# Patient Record
Sex: FEMALE | Race: ASIAN
[De-identification: names, ages, dates, MRNs, and addresses within clinical notes are randomized per-mention and may not be internally consistent; named-entity substitution may affect disease eponyms.]

---

## 2020-03-04 ENCOUNTER — HOSPITAL ENCOUNTER (INPATIENT)
Dept: HOSPITAL 72 - EMR | Age: 85
LOS: 6 days | Discharge: NURSING, REHAB OR LONG-TERM CARE | DRG: 871 | End: 2020-03-10
Payer: MEDICARE

## 2020-03-04 VITALS — SYSTOLIC BLOOD PRESSURE: 112 MMHG | DIASTOLIC BLOOD PRESSURE: 45 MMHG

## 2020-03-04 VITALS — SYSTOLIC BLOOD PRESSURE: 105 MMHG | DIASTOLIC BLOOD PRESSURE: 51 MMHG

## 2020-03-04 VITALS — SYSTOLIC BLOOD PRESSURE: 104 MMHG | DIASTOLIC BLOOD PRESSURE: 58 MMHG

## 2020-03-04 VITALS — SYSTOLIC BLOOD PRESSURE: 103 MMHG | DIASTOLIC BLOOD PRESSURE: 62 MMHG

## 2020-03-04 VITALS — HEIGHT: 60 IN | BODY MASS INDEX: 26.31 KG/M2 | WEIGHT: 134 LBS

## 2020-03-04 DIAGNOSIS — F03.91: ICD-10-CM

## 2020-03-04 DIAGNOSIS — E11.65: ICD-10-CM

## 2020-03-04 DIAGNOSIS — B96.20: ICD-10-CM

## 2020-03-04 DIAGNOSIS — Z86.73: ICD-10-CM

## 2020-03-04 DIAGNOSIS — G93.40: ICD-10-CM

## 2020-03-04 DIAGNOSIS — I13.0: ICD-10-CM

## 2020-03-04 DIAGNOSIS — J10.00: ICD-10-CM

## 2020-03-04 DIAGNOSIS — N39.0: ICD-10-CM

## 2020-03-04 DIAGNOSIS — A41.9: Primary | ICD-10-CM

## 2020-03-04 DIAGNOSIS — E11.22: ICD-10-CM

## 2020-03-04 DIAGNOSIS — E87.0: ICD-10-CM

## 2020-03-04 DIAGNOSIS — N18.9: ICD-10-CM

## 2020-03-04 DIAGNOSIS — I25.10: ICD-10-CM

## 2020-03-04 DIAGNOSIS — I50.33: ICD-10-CM

## 2020-03-04 DIAGNOSIS — N17.9: ICD-10-CM

## 2020-03-04 DIAGNOSIS — I35.1: ICD-10-CM

## 2020-03-04 DIAGNOSIS — E44.0: ICD-10-CM

## 2020-03-04 DIAGNOSIS — Z66: ICD-10-CM

## 2020-03-04 DIAGNOSIS — Z16.24: ICD-10-CM

## 2020-03-04 DIAGNOSIS — D69.6: ICD-10-CM

## 2020-03-04 DIAGNOSIS — Z16.12: ICD-10-CM

## 2020-03-04 LAB
ADD MANUAL DIFF: NO
ALBUMIN SERPL-MCNC: 2.9 G/DL (ref 3.4–5)
ALBUMIN/GLOB SERPL: 0.8 {RATIO} (ref 1–2.7)
ALP SERPL-CCNC: 63 U/L (ref 46–116)
ALT SERPL-CCNC: 42 U/L (ref 12–78)
ANION GAP SERPL CALC-SCNC: 12 MMOL/L (ref 5–15)
APPEARANCE UR: (no result)
APTT BLD: 26 SEC (ref 23–33)
APTT PPP: (no result) S
AST SERPL-CCNC: 46 U/L (ref 15–37)
BASOPHILS NFR BLD AUTO: 1.1 % (ref 0–2)
BILIRUB SERPL-MCNC: 0.3 MG/DL (ref 0.2–1)
BUN SERPL-MCNC: 35 MG/DL (ref 7–18)
CALCIUM SERPL-MCNC: 8.8 MG/DL (ref 8.5–10.1)
CHLORIDE SERPL-SCNC: 109 MMOL/L (ref 98–107)
CK MB SERPL-MCNC: < 0.5 NG/ML (ref 0–3.6)
CO2 SERPL-SCNC: 26 MMOL/L (ref 21–32)
CREAT SERPL-MCNC: 1.6 MG/DL (ref 0.55–1.3)
EOSINOPHIL NFR BLD AUTO: 0.2 % (ref 0–3)
ERYTHROCYTE [DISTWIDTH] IN BLOOD BY AUTOMATED COUNT: 11.1 % (ref 11.6–14.8)
GLOBULIN SER-MCNC: 3.8 G/DL
GLUCOSE UR STRIP-MCNC: NEGATIVE MG/DL
HCT VFR BLD CALC: 35.8 % (ref 37–47)
HGB BLD-MCNC: 12.6 G/DL (ref 12–16)
INR PPP: 0.9 (ref 0.9–1.1)
KETONES UR QL STRIP: NEGATIVE
LEUKOCYTE ESTERASE UR QL STRIP: (no result)
LYMPHOCYTES NFR BLD AUTO: 27.1 % (ref 20–45)
MCV RBC AUTO: 100 FL (ref 80–99)
MONOCYTES NFR BLD AUTO: 10.7 % (ref 1–10)
NEUTROPHILS NFR BLD AUTO: 61 % (ref 45–75)
NITRITE UR QL STRIP: POSITIVE
PH UR STRIP: 5 [PH] (ref 4.5–8)
PHOSPHATE SERPL-MCNC: 4.1 MG/DL (ref 2.5–4.9)
PLATELET # BLD: 118 K/UL (ref 150–450)
POTASSIUM SERPL-SCNC: 4.1 MMOL/L (ref 3.5–5.1)
PROT UR QL STRIP: (no result)
RBC # BLD AUTO: 3.58 M/UL (ref 4.2–5.4)
SODIUM SERPL-SCNC: 147 MMOL/L (ref 136–145)
SP GR UR STRIP: 1.01 (ref 1–1.03)
UROBILINOGEN UR-MCNC: NORMAL MG/DL (ref 0–1)
WBC # BLD AUTO: 4.8 K/UL (ref 4.8–10.8)

## 2020-03-04 PROCEDURE — 81003 URINALYSIS AUTO W/O SCOPE: CPT

## 2020-03-04 PROCEDURE — 71045 X-RAY EXAM CHEST 1 VIEW: CPT

## 2020-03-04 PROCEDURE — 83036 HEMOGLOBIN GLYCOSYLATED A1C: CPT

## 2020-03-04 PROCEDURE — 87086 URINE CULTURE/COLONY COUNT: CPT

## 2020-03-04 PROCEDURE — 93005 ELECTROCARDIOGRAM TRACING: CPT

## 2020-03-04 PROCEDURE — 96375 TX/PRO/DX INJ NEW DRUG ADDON: CPT

## 2020-03-04 PROCEDURE — 85025 COMPLETE CBC W/AUTO DIFF WBC: CPT

## 2020-03-04 PROCEDURE — 36415 COLL VENOUS BLD VENIPUNCTURE: CPT

## 2020-03-04 PROCEDURE — 87040 BLOOD CULTURE FOR BACTERIA: CPT

## 2020-03-04 PROCEDURE — 83605 ASSAY OF LACTIC ACID: CPT

## 2020-03-04 PROCEDURE — 36600 WITHDRAWAL OF ARTERIAL BLOOD: CPT

## 2020-03-04 PROCEDURE — 82746 ASSAY OF FOLIC ACID SERUM: CPT

## 2020-03-04 PROCEDURE — 82553 CREATINE MB FRACTION: CPT

## 2020-03-04 PROCEDURE — 93306 TTE W/DOPPLER COMPLETE: CPT

## 2020-03-04 PROCEDURE — 87181 SC STD AGAR DILUTION PER AGT: CPT

## 2020-03-04 PROCEDURE — 96365 THER/PROPH/DIAG IV INF INIT: CPT

## 2020-03-04 PROCEDURE — 85610 PROTHROMBIN TIME: CPT

## 2020-03-04 PROCEDURE — 99285 EMERGENCY DEPT VISIT HI MDM: CPT

## 2020-03-04 PROCEDURE — 82803 BLOOD GASES ANY COMBINATION: CPT

## 2020-03-04 PROCEDURE — 80053 COMPREHEN METABOLIC PANEL: CPT

## 2020-03-04 PROCEDURE — 96368 THER/DIAG CONCURRENT INF: CPT

## 2020-03-04 PROCEDURE — 82962 GLUCOSE BLOOD TEST: CPT

## 2020-03-04 PROCEDURE — 85730 THROMBOPLASTIN TIME PARTIAL: CPT

## 2020-03-04 PROCEDURE — 83735 ASSAY OF MAGNESIUM: CPT

## 2020-03-04 PROCEDURE — 83880 ASSAY OF NATRIURETIC PEPTIDE: CPT

## 2020-03-04 PROCEDURE — 86710 INFLUENZA VIRUS ANTIBODY: CPT

## 2020-03-04 PROCEDURE — 84443 ASSAY THYROID STIM HORMONE: CPT

## 2020-03-04 PROCEDURE — 82607 VITAMIN B-12: CPT

## 2020-03-04 PROCEDURE — 87081 CULTURE SCREEN ONLY: CPT

## 2020-03-04 PROCEDURE — 84100 ASSAY OF PHOSPHORUS: CPT

## 2020-03-04 PROCEDURE — 94640 AIRWAY INHALATION TREATMENT: CPT

## 2020-03-04 RX ADMIN — IPRATROPIUM BROMIDE AND ALBUTEROL SULFATE SCH ML: .5; 3 SOLUTION RESPIRATORY (INHALATION) at 15:02

## 2020-03-04 RX ADMIN — IPRATROPIUM BROMIDE AND ALBUTEROL SULFATE SCH ML: .5; 3 SOLUTION RESPIRATORY (INHALATION) at 15:03

## 2020-03-04 RX ADMIN — IPRATROPIUM BROMIDE AND ALBUTEROL SULFATE SCH ML: .5; 3 SOLUTION RESPIRATORY (INHALATION) at 15:04

## 2020-03-04 NOTE — EMERGENCY ROOM REPORT
History of Present Illness


General


Chief Complaint:  Fever


Source:  Medical Record, EMS





Present Illness


HPI


Patient is a 88-year-old female brought in by EMS for fever and positive 

influenza.  Patient has medical history of chronic kidney disease, hypertension

, diabetes, peripheral vascular disease, anemia, hyperlipidemia, dementia, 

cardiomegaly, CVA, atherosclerosis, and has DNR in chart.  Unable to obtain 

history from patient because she is refusing to respond to my questioning.  Per 

EMS patient had positive influenza in the nursing home.


Allergies:  


Coded Allergies:  


     No Known Allergies (Unverified , 11/21/16)





Patient History


Limited by:  other - Dementia


Reviewed Nursing Documentation:  PMH: Agreed; PSxH: Agreed





Nursing Documentation-PMH


Hx Hypertension:  Yes


Hx Diabetes:  Yes


Hx Cerebrovascular Accident:  Yes





Review of Systems


All Other Systems:  negative except mentioned in HPI





Physical Exam





Vital Signs








  Date Time  Temp Pulse Resp B/P (MAP) Pulse Ox O2 Delivery O2 Flow Rate FiO2


 


3/4/20 12:58 99.9 98 16 104/58 (73) 98 Room Air  








Sp02 EP Interpretation:  reviewed, normal


General Appearance:  no apparent distress, alert, non-toxic


Head:  normocephalic, atraumatic


Eyes:  bilateral eye normal inspection, bilateral eye PERRL


ENT:  normal pharynx, no angioedema


Neck:  full range of motion, supple/symm/no masses


Respiratory:  chest non-tender, rhonchi, wheezing


Cardiovascular #1:  regular rate, rhythm, no edema


Cardiovascular #2:  2+ carotid (R), 2+ carotid (L), 2+ radial (R), 2+ radial (L)

, 2+ dorsalis pedis (R), 2+ dorsalis pedis (L)


Gastrointestinal:  normal bowel sounds, non tender, soft, non-distended, no 

guarding, no rebound


Rectal:  deferred


Genitourinary:  normal inspection, no CVA tenderness


Musculoskeletal:  back normal, normal range of motion, calf tenderness, gait/

station normal, non-tender


Neurologic:  alert


Lymphatic:  no adenopathy





Medical Decision Making


Diagnostic Impression:  


 Primary Impression:  


 Influenza


 Additional Impression:  


 Pneumonia


ER Course


Patient given IV fluids.  Patient started on broad-spectrum antibiotics as well 

as Tamiflu.  Patient has been pancultured.





Last Vital Signs








  Date Time  Temp Pulse Resp B/P (MAP) Pulse Ox O2 Delivery O2 Flow Rate FiO2


 


3/4/20 12:58 99.9 98 16 104/58 (92) 98 Room Air  








Disposition:  ADMITTED AS INPATIENT


Condition:  Critical


Physician Consult:  Dr Myles called in the patient and will be admitting her.











Vivi Vasques M.D. Mar 4, 2020 13:09

## 2020-03-04 NOTE — DIAGNOSTIC IMAGING REPORT
Indication: Dyspnea

 

Comparison:  11/21/2016

 

A single view chest radiograph was obtained.

 

Findings:

 

Study appears somewhat underexposed but that there is evidence of mild pulmonary

vascular congestion centrally. The heart is enlarged. Lung volumes are low. Aorta is

calcified. Bones are osteopenic.

 

IMPRESSION:

 

Suspected mild CHF. Correlate clinically

## 2020-03-05 VITALS — SYSTOLIC BLOOD PRESSURE: 127 MMHG | DIASTOLIC BLOOD PRESSURE: 68 MMHG

## 2020-03-05 VITALS — DIASTOLIC BLOOD PRESSURE: 66 MMHG | SYSTOLIC BLOOD PRESSURE: 125 MMHG

## 2020-03-05 VITALS — DIASTOLIC BLOOD PRESSURE: 67 MMHG | SYSTOLIC BLOOD PRESSURE: 118 MMHG

## 2020-03-05 VITALS — DIASTOLIC BLOOD PRESSURE: 71 MMHG | SYSTOLIC BLOOD PRESSURE: 118 MMHG

## 2020-03-05 VITALS — DIASTOLIC BLOOD PRESSURE: 73 MMHG | SYSTOLIC BLOOD PRESSURE: 150 MMHG

## 2020-03-05 LAB
ADD MANUAL DIFF: NO
ALBUMIN SERPL-MCNC: 2.6 G/DL (ref 3.4–5)
ALBUMIN/GLOB SERPL: 0.7 {RATIO} (ref 1–2.7)
ALP SERPL-CCNC: 59 U/L (ref 46–116)
ALT SERPL-CCNC: 45 U/L (ref 12–78)
ANION GAP SERPL CALC-SCNC: 10 MMOL/L (ref 5–15)
AST SERPL-CCNC: 52 U/L (ref 15–37)
BASOPHILS NFR BLD AUTO: 0.4 % (ref 0–2)
BILIRUB SERPL-MCNC: 0.2 MG/DL (ref 0.2–1)
BUN SERPL-MCNC: 25 MG/DL (ref 7–18)
CALCIUM SERPL-MCNC: 8 MG/DL (ref 8.5–10.1)
CHLORIDE SERPL-SCNC: 109 MMOL/L (ref 98–107)
CO2 SERPL-SCNC: 21 MMOL/L (ref 21–32)
CREAT SERPL-MCNC: 1.2 MG/DL (ref 0.55–1.3)
EOSINOPHIL NFR BLD AUTO: 0 % (ref 0–3)
ERYTHROCYTE [DISTWIDTH] IN BLOOD BY AUTOMATED COUNT: 11.3 % (ref 11.6–14.8)
GLOBULIN SER-MCNC: 3.9 G/DL
HCT VFR BLD CALC: 36.3 % (ref 37–47)
HGB BLD-MCNC: 12.7 G/DL (ref 12–16)
LYMPHOCYTES NFR BLD AUTO: 17.3 % (ref 20–45)
MCV RBC AUTO: 101 FL (ref 80–99)
MONOCYTES NFR BLD AUTO: 2.6 % (ref 1–10)
NEUTROPHILS NFR BLD AUTO: 79.8 % (ref 45–75)
PLATELET # BLD: 113 K/UL (ref 150–450)
POTASSIUM SERPL-SCNC: 4.5 MMOL/L (ref 3.5–5.1)
RBC # BLD AUTO: 3.62 M/UL (ref 4.2–5.4)
SODIUM SERPL-SCNC: 140 MMOL/L (ref 136–145)
WBC # BLD AUTO: 4.2 K/UL (ref 4.8–10.8)

## 2020-03-05 RX ADMIN — HEPARIN SODIUM SCH UNITS: 5000 INJECTION INTRAVENOUS; SUBCUTANEOUS at 21:00

## 2020-03-05 RX ADMIN — INSULIN ASPART SCH UNITS: 100 INJECTION, SOLUTION INTRAVENOUS; SUBCUTANEOUS at 21:00

## 2020-03-05 RX ADMIN — IPRATROPIUM BROMIDE AND ALBUTEROL SULFATE SCH ML: .5; 3 SOLUTION RESPIRATORY (INHALATION) at 07:37

## 2020-03-05 RX ADMIN — IPRATROPIUM BROMIDE AND ALBUTEROL SULFATE SCH ML: .5; 3 SOLUTION RESPIRATORY (INHALATION) at 19:00

## 2020-03-05 RX ADMIN — IPRATROPIUM BROMIDE AND ALBUTEROL SULFATE SCH ML: .5; 3 SOLUTION RESPIRATORY (INHALATION) at 13:57

## 2020-03-05 RX ADMIN — INSULIN ASPART SCH UNITS: 100 INJECTION, SOLUTION INTRAVENOUS; SUBCUTANEOUS at 12:20

## 2020-03-05 RX ADMIN — INSULIN ASPART SCH UNITS: 100 INJECTION, SOLUTION INTRAVENOUS; SUBCUTANEOUS at 16:30

## 2020-03-05 NOTE — HISTORY AND PHYSICAL REPORT
DATE OF ADMISSION:  03/04/2020

CHIEF COMPLAINT:  Influenza, fevers.



HISTORY OF PRESENT ILLNESS:  The patient is an 89-year-old female.  She has

multiple medical problems including history of chronic kidney disease,

hypertension, diabetes, stroke, congestive heart failure.  She presented

from a skilled nursing facility with complaints of fevers, cough,

congestion.  She was influenza A positive.  There has apparently been an

outbreak of influenza at the skilled nursing facility.  On evaluation

here, she was febrile.  She was slightly hypoxic.  She had an elevated

sodium and creatinine.  She also had evidence of urinary tract infection.

She has been started on antibiotic therapy as well as treatment for the

flu and is now admitted for further evaluation and care.



PAST MEDICAL HISTORY:  As above.



PAST SURGICAL HISTORY:  None.



CURRENT MEDICATIONS:  Reconciled and reviewed.



ALLERGIES:  None.



FAMILY HISTORY:  None.



SOCIAL HISTORY:  There is no known history of tobacco, ethanol, or drugs.



REVIEW OF SYSTEMS:  Unobtainable as the patient is confused.



PHYSICAL EXAMINATION:

VITAL SIGNS:  Temperature 98 degrees, pulse 86, respirations 20, blood

pressure 150/73.

GENERAL:  The patient is well developed, no apparent distress.

HEART:  Regular rate and rhythm.

LUNGS:  Clear.

ABDOMEN:  Soft, nontender, nondistended.

EXTREMITIES:  Without clubbing, cyanosis, or edema.



LABORATORY DATA:  UA showed too numerous to count wbc's.  White count 5,

hemoglobin is 12.  Sodium 147, potassium is 4, creatinine is _____.



ASSESSMENT:  This is an elderly female with a history of chronic kidney

disease, hypertension, diabetes, congestive heart failure, admitted with

complaints of influenza and urinary tract infection, possible sepsis.



PLAN:

1. IV antibiotics.

2. Tamiflu for influenza.

3. Gentle hydration.

4. Cardiology consultation to assist with blood pressure management.

5. Follow up pending cultures.

6. Adjust antibiotics as indicated.

7. The patient would be isolated.









  ______________________________________________

  Michael Hernández M.D. DR:  LEN

D:  03/05/2020 06:56

T:  03/05/2020 20:53

JOB#:  [id number]

CC:

## 2020-03-05 NOTE — CONSULTATION
DATE OF CONSULTATION:  03/05/2020

PULMONARY CONSULTATION



CONSULTING PHYSICIAN:  Abrahan Zhao M.D.



HISTORY OF PRESENT ILLNESS:  This is an 89-year-old female who is a nursing

resident.  She was brought to the hospital with cough and congestion.  She

is found to have influenza A positivity.  This was done actually at

outside facility.  A repeat swab at this hospital has been negative.  The

patient at this time appears to be comfortable.



ALLERGIES:  Unreliable.



PAST MEDICAL HISTORY:  Notable for hypertension, diabetes mellitus, CKD,

chronic anemia, hyperlipidemia, previous CVA.



CODE STATUS:  DNR.



MEDICATIONS:  Home medications reviewed and reconciled in the

chart.



SOCIAL HISTORY:  No history of alcohol or tobacco use.  She is a nursing

home resident.



PHYSICAL EXAMINATION:

GENERAL:  Reveals elderly female.  T-max 100.9.

VITAL SIGNS:  Blood pressure 108/60, heart rate 94, respiratory rate 18,

she is afebrile.

HEENT:  Unremarkable.

LUNGS:  Clear breath sounds bilaterally with normal heart sounds.

ABDOMEN:  Soft.  There is no edema.



LABORATORY AND DIAGNOSTIC DATA:  Imaging study obtained overnight shows

evidence for mild pulmonary _____ congestion.



IMPRESSION:

1. Influenza A.

2. Mild pulmonary edema.

3. Hypertension.

4. DNR.



DISCUSSION:  Agree with admission and care.  The patient is started on

Tamiflu.  She appears to be comfortable.  Continue breathing treatments.

We will follow as needed.  Anticipate discharge back to facility in 24

hours.









  ______________________________________________

  Abrahan Zhao M.D.





DR:  MADONNA

D:  03/05/2020 10:58

T:  03/05/2020 15:10

JOB#:  [id number]

CC:

## 2020-03-05 NOTE — CONSULTATION
DATE OF CONSULTATION:  03/04/2020

CARDIOLOGY CONSULTATION



CONSULTING PHYSICIAN:  Conor Patterson M.D.



REQUESTING PHYSICIAN:  Michael Hernández M.D.



REASON FOR CONSULTATION:  Elevated natriuretic peptide essay in the setting

of acute pulmonary infection.



HISTORY OF PRESENT ILLNESS:  This is an 89-year-old female residing at a

skilled nursing facility.  She was seen in the emergency room because of

an abnormal influenza swab as well as upper respiratory symptoms described

by the skilled nursing facility.  The patient's respiratory swab was

positive for influenza A. in the emergency room, however, a repeat swab

was negative.  The patient also had an abnormal chest x-ray and

hospitalization was initiated for further care.  Cardiology evaluation was

requested in view of abnormally elevated natriuretic peptide assay of over

670.



PAST MEDICAL HISTORY:

1. Chronic acute hypertension with hypertensive heart disease.

2. Type 2 diabetes mellitus.

3. Chronic kidney disease.

4. Peripheral artery disease.

5. Anemia of chronic kidney disease.

6. Hyperlipidemia.

7. Cerebrovascular disease with history of cerebrovascular accident and

dementia.

8. Atherosclerosis.

9. Advance directives DNR.



MEDICATIONS:  Prior to admission, reviewed and reconciled.



ALLERGIES:  None known.



SOCIAL HISTORY:  No record of smoking, alcohol, or substance abuse.



REVIEW OF SYSTEMS:  Unobtainable from the patient.  After review of

available records from the skilled nursing facility was completed and

positive findings outlined above.



PHYSICAL EXAMINATION:

VITAL SIGNS:  Temperature 99.9, blood pressure 104/58, heart rate 98,

respiratory rate 16, and oxygen saturation on room air 98%.

HEENT:  Normocephalic and atraumatic.  Conjunctivae pink.  Oropharynx

clear.

NECK:  Supple.  Jugular venous pressure normal.  No accessory muscle use.

LUNGS:  Coarse breath sounds.  Diffuse rhonchi.

CARDIAC:  Regular rhythm and rate.  Normal S1, S2 with a 1/6 systolic

murmur at base.

ABDOMEN:  Soft and nontender.  No guarding or rebound.

EXTREMITIES:  Good pulses.  No edema.

NEUROLOGIC:  Nonfocal.



LABORATORY DATA:  Sodium 147, potassium 4.1, chloride 109, bicarb 26, BUN

35, creatinine 1.6, glucose was 62.  Lactic acid 1.6.  Pro-natriuretic

peptide 670.  Albumin 2.9.  ABG, 7.40, 42, 63.  White count 4.8 and

hemoglobin 12.6.  Urinalysis with too numerous to count white cells.

Chest x-ray with pulmonary venous congestion and possible interstitial

infiltrates.



IMPRESSION:

1. Influenza A.

2. Possible healthcare-associated pneumonia.

3. Dehydration.

4. Hypernatremia.

5. Acute on chronic kidney injury.

6. Hypovolemia.

7. Acute on chronic diastolic congestive heart failure.

8. Cerebrovascular disease with dementia.

9. Hypertensive heart disease.

10. Type 2 diabetes mellitus with complications.

11. Urinary tract infection.

12. Possible sepsis.



PLAN:

1. Respiratory isolation.

2. Empiric antimicrobials.

3. Inhaled bronchodilators as needed.

4. Tamiflu therapy.

5. Hypotonic IV fluids.

6. Hold diuresis for now.

7. DVT prophylaxis.









  ______________________________________________

  Conor Patterson M.D.





DR:  MICHAEL

D:  03/05/2020 02:40

T:  03/05/2020 03:01

JOB#:  [id number]

CC:

## 2020-03-06 VITALS — DIASTOLIC BLOOD PRESSURE: 95 MMHG | SYSTOLIC BLOOD PRESSURE: 116 MMHG

## 2020-03-06 VITALS — DIASTOLIC BLOOD PRESSURE: 85 MMHG | SYSTOLIC BLOOD PRESSURE: 141 MMHG

## 2020-03-06 VITALS — SYSTOLIC BLOOD PRESSURE: 111 MMHG | DIASTOLIC BLOOD PRESSURE: 73 MMHG

## 2020-03-06 VITALS — DIASTOLIC BLOOD PRESSURE: 90 MMHG | SYSTOLIC BLOOD PRESSURE: 120 MMHG

## 2020-03-06 VITALS — DIASTOLIC BLOOD PRESSURE: 82 MMHG | SYSTOLIC BLOOD PRESSURE: 142 MMHG

## 2020-03-06 RX ADMIN — INSULIN ASPART SCH UNITS: 100 INJECTION, SOLUTION INTRAVENOUS; SUBCUTANEOUS at 11:23

## 2020-03-06 RX ADMIN — IPRATROPIUM BROMIDE AND ALBUTEROL SULFATE SCH ML: .5; 3 SOLUTION RESPIRATORY (INHALATION) at 07:51

## 2020-03-06 RX ADMIN — HEPARIN SODIUM SCH UNITS: 5000 INJECTION INTRAVENOUS; SUBCUTANEOUS at 08:17

## 2020-03-06 RX ADMIN — INSULIN ASPART SCH UNITS: 100 INJECTION, SOLUTION INTRAVENOUS; SUBCUTANEOUS at 21:00

## 2020-03-06 RX ADMIN — IPRATROPIUM BROMIDE AND ALBUTEROL SULFATE SCH ML: .5; 3 SOLUTION RESPIRATORY (INHALATION) at 19:16

## 2020-03-06 RX ADMIN — INSULIN ASPART SCH UNITS: 100 INJECTION, SOLUTION INTRAVENOUS; SUBCUTANEOUS at 16:30

## 2020-03-06 RX ADMIN — IPRATROPIUM BROMIDE AND ALBUTEROL SULFATE SCH ML: .5; 3 SOLUTION RESPIRATORY (INHALATION) at 13:00

## 2020-03-06 RX ADMIN — HEPARIN SODIUM SCH UNITS: 5000 INJECTION INTRAVENOUS; SUBCUTANEOUS at 19:44

## 2020-03-06 RX ADMIN — IPRATROPIUM BROMIDE AND ALBUTEROL SULFATE SCH ML: .5; 3 SOLUTION RESPIRATORY (INHALATION) at 00:46

## 2020-03-06 RX ADMIN — INSULIN ASPART SCH UNITS: 100 INJECTION, SOLUTION INTRAVENOUS; SUBCUTANEOUS at 06:30

## 2020-03-06 NOTE — PULMONOLOGY PROGRESS NOTE
Assessment/Plan


Assessment/Plan


IMPRESSION:


1. Influenza A.


2. Mild pulmonary edema.


3. Hypertension.


4. DNR.





DISCUSSION:  Agree with admission and care.  continue


Tamiflu.  She appears to be comfortable.  Continue breathing treatments.


I will follow as needed.  Anticipate discharge back to facility in 24


hours.














  ______________________________________________


  Abrahan Zhao M.D.





Subjective


Interval Events:  None new


Constitutional:  Reports: no symptoms


HEENT:  Repors: no symptoms


Respiratory:  Reports: no symptoms


Cardiovascular:  Reports: no symptoms


Gastrointestinal/Abdominal:  Reports: no symptoms


Genitourinary:  Reports: no symptoms


Allergies:  


Coded Allergies:  


     No Known Allergies (Unverified , 11/21/16)





Objective





Last 24 Hour Vital Signs








  Date Time  Temp Pulse Resp B/P (MAP) Pulse Ox O2 Delivery O2 Flow Rate FiO2


 


3/6/20 12:00 97.3 107 20 142/82 (102) 96   


 


3/6/20 09:00      Room Air  


 


3/6/20 08:00 97.2 94 20 141/85 (103) 94   


 


3/6/20 07:40  83 20  99 Room Air  21





  81 20  93   


 


3/6/20 04:00 97.6 89 17 116/95 (102) 96   


 


3/6/20 00:00 98.5 83 17 120/90 (100) 92   


 


3/5/20 21:00      Room Air  


 


3/5/20 16:00 98.0 110 19 118/71 (87) 93   


 


3/5/20 13:55  92 18  96 Room Air  21





  90 18  94   

















Intake and Output  


 


 3/5/20 3/6/20





 19:00 07:00


 


Intake Total 480 ml 400 ml


 


Output Total  4 ml


 


Balance 480 ml 396 ml


 


  


 


Intake Oral 480 ml 


 


IV Total  400 ml


 


Output Urine Total  4 ml


 


# Voids 4 








General Appearance:  no acute distress


HEENT:  normocephalic


Respiratory/Chest:  chest wall non-tender, lungs clear


Cardiovascular:  normal peripheral pulses


Abdomen:  normal bowel sounds





Microbiology








 Date/Time


Source Procedure


Growth Status


 


 


 3/4/20 14:10


Nasal Nares - Final Complete


 


 3/4/20 14:10


Nasal Nares - Final Complete


 


 3/4/20 19:49


Urine,Clean Catch Urine Culture - Preliminary


Gram Negative Bacillus 1 Resulted











Current Medications








 Medications


  (Trade)  Dose


 Ordered  Sig/Vania


 Route


 PRN Reason  Start Time


 Stop Time Status Last Admin


Dose Admin


 


 Albuterol/


 Ipratropium


  (Albuterol/


 Ipratropium)  3 ml  Q6HRT


 HHN


   3/5/20 07:00


 3/10/20 06:59  3/6/20 07:51


 


 


 Ceftriaxone


 Sodium 1 gm/


 Sodium Chloride  55 ml @ 


 110 mls/hr  Q24H


 IVPB


   3/6/20 15:00


 3/13/20 14:59   


 


 


 Dextrose


  (Dextrose 50%)  25 ml  Q30M  PRN


 IV


 Hypoglycemia  3/5/20 07:00


 4/4/20 06:59   


 


 


 Dextrose


  (Dextrose 50%)  50 ml  Q30M  PRN


 IV


 Hypoglycemia  3/5/20 07:00


 4/4/20 06:59   


 


 


 Heparin Sodium


  (Porcine)


  (Heparin 5000


 units/ml)  5,000 units  EVERY 12  HOURS


 SUBQ


   3/5/20 21:00


 4/4/20 20:59   


 


 


 Insulin Aspart


  (NovoLOG)    BEFORE MEALS AND  HS


 SUBQ


   3/5/20 11:30


 4/4/20 11:29  3/6/20 11:23


 


 


 Oseltamivir


 Phosphate


  (Tamiflu)  30 mg  DAILY


 ORAL


   3/6/20 09:00


 3/10/20 08:59  3/6/20 12:00


 

















Abrahan Zhao MD Mar 6, 2020 13:58

## 2020-03-06 NOTE — GENERAL PROGRESS NOTE
Assessment/Plan


Problem List:  


(1) UTI (urinary tract infection)


ICD Codes:  N39.0 - Urinary tract infection, site not specified


SNOMED:  85388622


(2) Encephalopathy


ICD Codes:  G93.40 - Encephalopathy, unspecified


SNOMED:  58327316


(3) Diabetes mellitus


ICD Codes:  E11.9 - Type 2 diabetes mellitus without complications


SNOMED:  92077442


(4) Sepsis


ICD Codes:  A41.9 - Sepsis, unspecified organism


SNOMED:  80392886


(5) Acute encephalopathy


ICD Codes:  G93.40 - Encephalopathy, unspecified


SNOMED:  7163597


(6) Influenza


ICD Codes:  J11.1 - Influenza due to unidentified influenza virus with other 

respiratory manifestations


SNOMED:  3545206


(7) Pneumonia


ICD Codes:  J18.9 - Pneumonia, unspecified organism


SNOMED:  073699459


Status:  stable, progressing


Assessment/Plan:


tamiflu


resp care


tylenol


ivf 


iv abx. 


follow up cultures


anxiolytics


compliance stressed





Subjective


ROS Limited/Unobtainable:  No


Constitutional:  Reports: malaise, weakness


HEENT:  Reports: no symptoms


Cardiovascular:  Reports: no symptoms


Respiratory:  Reports: cough, shortness of breath


Gastrointestinal/Abdominal:  Reports: no symptoms


Genitourinary:  Reports: no symptoms


Neurologic/Psychiatric:  Reports: anxiety, emotional problems, pre-existing 

deficit


Endocrine:  Reports: no symptoms


Hematologic/Lymphatic:  Reports: anemia


Allergies:  


Coded Allergies:  


     No Known Allergies (Unverified , 11/21/16)


All Systems:  reviewed and negative except above


Subjective


no events. uncooperative with meds and care at times. refused tamiflu this 

morning but agreed to take this afternoon. on iv abx. Ucx with gnr





Objective





Last 24 Hour Vital Signs








  Date Time  Temp Pulse Resp B/P (MAP) Pulse Ox O2 Delivery O2 Flow Rate FiO2


 


3/6/20 12:00 97.3 107 20 142/82 (102) 96   


 


3/6/20 09:00      Room Air  


 


3/6/20 08:00 97.2 94 20 141/85 (103) 94   


 


3/6/20 07:40  83 20  99 Room Air  21





  81 20  93   


 


3/6/20 04:00 97.6 89 17 116/95 (102) 96   


 


3/6/20 00:00 98.5 83 17 120/90 (100) 92   


 


3/5/20 21:00      Room Air  


 


3/5/20 16:00 98.0 110 19 118/71 (87) 93   

















Intake and Output  


 


 3/5/20 3/6/20





 19:00 07:00


 


Intake Total 480 ml 400 ml


 


Output Total  4 ml


 


Balance 480 ml 396 ml


 


  


 


Intake Oral 480 ml 


 


IV Total  400 ml


 


Output Urine Total  4 ml


 


# Voids 4 








Height (Feet):  5


Height (Inches):  0.00


Weight (Pounds):  135


General Appearance:  WD/WN, alert, confused


Neck:  supple


Cardiovascular:  normal rate, regular rhythm


Respiratory/Chest:  chest wall non-tender, lungs clear, normal breath sounds, 

no respiratory distress, no accessory muscle use


Abdomen:  normal bowel sounds, non tender, soft, no organomegaly, no mass


Edema:  no edema noted Arm (L), no edema noted Arm (R), no edema noted Leg (L), 

no edema noted Leg (R), no edema noted Pedal (L), no edema noted Pedal (R), no 

edema noted Generalized


Neurologic:  alert











Michael Hernández MD Mar 6, 2020 15:33

## 2020-03-06 NOTE — PROGRESS NOTE
DATE:  03/05/2020

CARDIOLOGY PROGRESS NOTE



SUBJECTIVE:  The patient has less congestion and shortness of

breath.



OBJECTIVE:

VITAL SIGNS:  Blood pressure 118/71, pulse 110, and respirations 19.

Afebrile.  Oxygen saturation 93% on room air.



LABORATORY DATA:  Urine culture pending.  White count 4.2, hemoglobin 12.7.

Sodium 140, potassium 4.5, BUN 25, and creatinine 1.2.  Glucose 362.  B12

and folate, thyroid function within normal limits.  Albumin 2.6.



IMPRESSION:

1. Type 2 diabetes mellitus with hyperglycemia.

2. Influenza A.

3. Dehydration and hypernatremia, recovered.

4. Acute on chronic renal failure, resolved.

5. Acute on chronic diastolic congestive heart failure, clinically

improved.



PLAN:

1. Antiviral therapy.

2. Respiratory hygiene.

3. Await urine culture.

4. Empiric antimicrobials.

5. Adjust IV fluids.

6. Recheck lab studies.

7. Trend natriuretic peptide assay.

8. Discontinue IV fluids.









  ______________________________________________

  Conor Patterson M.D.





DR:  CAREY

D:  03/05/2020 23:39

T:  03/06/2020 00:01

JOB#:  [id number]

CC:

## 2020-03-07 VITALS — DIASTOLIC BLOOD PRESSURE: 64 MMHG | SYSTOLIC BLOOD PRESSURE: 127 MMHG

## 2020-03-07 VITALS — SYSTOLIC BLOOD PRESSURE: 137 MMHG | DIASTOLIC BLOOD PRESSURE: 68 MMHG

## 2020-03-07 VITALS — SYSTOLIC BLOOD PRESSURE: 110 MMHG | DIASTOLIC BLOOD PRESSURE: 61 MMHG

## 2020-03-07 VITALS — DIASTOLIC BLOOD PRESSURE: 70 MMHG | SYSTOLIC BLOOD PRESSURE: 115 MMHG

## 2020-03-07 VITALS — DIASTOLIC BLOOD PRESSURE: 75 MMHG | SYSTOLIC BLOOD PRESSURE: 139 MMHG

## 2020-03-07 VITALS — DIASTOLIC BLOOD PRESSURE: 69 MMHG | SYSTOLIC BLOOD PRESSURE: 138 MMHG

## 2020-03-07 RX ADMIN — ERTAPENEM SODIUM SCH MLS/HR: 1 INJECTION, POWDER, LYOPHILIZED, FOR SOLUTION INTRAMUSCULAR; INTRAVENOUS at 13:24

## 2020-03-07 RX ADMIN — INSULIN ASPART SCH UNITS: 100 INJECTION, SOLUTION INTRAVENOUS; SUBCUTANEOUS at 06:18

## 2020-03-07 RX ADMIN — INSULIN ASPART SCH UNITS: 100 INJECTION, SOLUTION INTRAVENOUS; SUBCUTANEOUS at 21:00

## 2020-03-07 RX ADMIN — INSULIN ASPART SCH UNITS: 100 INJECTION, SOLUTION INTRAVENOUS; SUBCUTANEOUS at 16:30

## 2020-03-07 RX ADMIN — INSULIN ASPART SCH UNITS: 100 INJECTION, SOLUTION INTRAVENOUS; SUBCUTANEOUS at 11:30

## 2020-03-07 RX ADMIN — HEPARIN SODIUM SCH UNITS: 5000 INJECTION INTRAVENOUS; SUBCUTANEOUS at 09:00

## 2020-03-07 RX ADMIN — HEPARIN SODIUM SCH UNITS: 5000 INJECTION INTRAVENOUS; SUBCUTANEOUS at 21:00

## 2020-03-07 NOTE — GENERAL PROGRESS NOTE
Assessment/Plan


Problem List:  


(1) UTI (urinary tract infection)


ICD Codes:  N39.0 - Urinary tract infection, site not specified


SNOMED:  93473932


(2) Encephalopathy


ICD Codes:  G93.40 - Encephalopathy, unspecified


SNOMED:  01500720


(3) Diabetes mellitus


ICD Codes:  E11.9 - Type 2 diabetes mellitus without complications


SNOMED:  50158319


(4) Sepsis


ICD Codes:  A41.9 - Sepsis, unspecified organism


SNOMED:  65758578


(5) Acute encephalopathy


ICD Codes:  G93.40 - Encephalopathy, unspecified


SNOMED:  4955561


(6) Influenza


ICD Codes:  J11.1 - Influenza due to unidentified influenza virus with other 

respiratory manifestations


SNOMED:  8852152


(7) Pneumonia


ICD Codes:  J18.9 - Pneumonia, unspecified organism


SNOMED:  363915910


Status:  stable, progressing


Assessment/Plan:


tamiflu


resp care


tylenol


ivf 


iv abx. 


id consult


follow up cultures


anxiolytics


compliance stressed





Subjective


ROS Limited/Unobtainable:  No


Constitutional:  Reports: malaise, weakness


HEENT:  Reports: no symptoms


Cardiovascular:  Reports: no symptoms


Respiratory:  Reports: cough


Gastrointestinal/Abdominal:  Reports: no symptoms


Genitourinary:  Reports: no symptoms


Neurologic/Psychiatric:  Reports: anxiety


Endocrine:  Reports: no symptoms


Hematologic/Lymphatic:  Reports: anemia


Allergies:  


Coded Allergies:  


     No Known Allergies (Unverified , 11/21/16)


All Systems:  reviewed and negative except above


Subjective


no events. remains uncooperative with meds and care. no fever or chills. ESBL 

uti noted. on tamiflu





Objective





Last 24 Hour Vital Signs








  Date Time  Temp Pulse Resp B/P (MAP) Pulse Ox O2 Delivery O2 Flow Rate FiO2


 


3/7/20 09:00      Room Air  


 


3/7/20 08:00 98.0 92 20 139/75 (96) 96   


 


3/7/20 04:00 98.0 84 19 115/70 (85) 97   


 


3/7/20 00:00 97.9 96 19 110/61 (77) 98   


 


3/6/20 21:00      Room Air  


 


3/6/20 16:00 98.2 104 20 111/73 (86) 96   


 


3/6/20 12:00 97.3 107 20 142/82 (102) 96   

















Intake and Output  


 


 3/6/20 3/7/20





 19:00 07:00


 


Intake Total 480 ml 


 


Balance 480 ml 


 


  


 


Intake Oral 480 ml 


 


# Voids  3








Height (Feet):  5


Height (Inches):  0.00


Weight (Pounds):  134


Objective


General Appearance:  WD/WN, alert, confused


Neck:  supple


Cardiovascular:  normal rate, regular rhythm


Respiratory/Chest:  chest wall non-tender, lungs clear, normal breath sounds, 

no respiratory distress, no accessory muscle use


Abdomen:  normal bowel sounds, non tender, soft, no organomegaly, no mass


Edema:  no edema noted Arm (L), no edema noted Arm (R), no edema noted Leg (L), 

no edema noted Leg (R), no edema noted Pedal (L), no edema noted Pedal (R), no 

edema noted Generalized


Neurologic:  alert











Michael Hernández MD Mar 7, 2020 11:28

## 2020-03-07 NOTE — PULMONOLOGY PROGRESS NOTE
Assessment/Plan


Assessment/Plan


IMPRESSION:


1. Influenza A.


2. Mild pulmonary edema.


3. Hypertension.


4. DNR.





DISCUSSION: Continue Tamiflu.    Continue breathing treatments.


I will follow as needed.  











  ______________________________________________


  Abrahan Zhao M.D.





Subjective


Interval Events:  None new


Constitutional:  Reports: no symptoms


HEENT:  Repors: no symptoms


Respiratory:  Reports: no symptoms


Cardiovascular:  Reports: no symptoms


Gastrointestinal/Abdominal:  Reports: no symptoms


Genitourinary:  Reports: no symptoms


Allergies:  


Coded Allergies:  


     No Known Allergies (Unverified , 11/21/16)





Objective





Last 24 Hour Vital Signs








  Date Time  Temp Pulse Resp B/P (MAP) Pulse Ox O2 Delivery O2 Flow Rate FiO2


 


3/7/20 16:00 98.1 91 20 137/68 (91) 96   


 


3/7/20 12:00 97.4 86 18 127/64 (85) 97   


 


3/7/20 09:00      Room Air  


 


3/7/20 08:00 98.0 92 20 139/75 (96) 96   


 


3/7/20 04:00 98.0 84 19 115/70 (85) 97   


 


3/7/20 00:00 97.9 96 19 110/61 (77) 98   


 


3/6/20 21:00      Room Air  

















Intake and Output  


 


 3/6/20 3/7/20





 19:00 07:00


 


Intake Total 480 ml 


 


Balance 480 ml 


 


  


 


Intake Oral 480 ml 


 


# Voids  3








General Appearance:  no acute distress


HEENT:  normocephalic


Respiratory/Chest:  chest wall non-tender


Cardiovascular:  normal peripheral pulses


Abdomen:  normal bowel sounds





Microbiology








 Date/Time


Source Procedure


Growth Status


 


 


 3/4/20 20:18


Nasal Nares MRSA Culture - Final


NO METHICILLIN RESISTANT STAPH AUREUS... Complete


 


 3/4/20 19:49


Urine,Clean Catch Urine Culture - Final


Escherichia Coli - Esbl Complete


 


 3/4/20 20:18


Rectum - Final


NO CARBAPENEM-RESISTANT ENTEROBACTERI... Complete


 


 3/4/20 20:18


Rectum VRE Culture - Final


NO VANCOMYCIN RESISTANT ENTEROCOCCUS ... Complete











Current Medications








 Medications


  (Trade)  Dose


 Ordered  Sig/Vania


 Route


 PRN Reason  Start Time


 Stop Time Status Last Admin


Dose Admin


 


 Dextrose


  (Dextrose 50%)  25 ml  Q30M  PRN


 IV


 Hypoglycemia  3/5/20 07:00


 4/4/20 06:59   


 


 


 Dextrose


  (Dextrose 50%)  50 ml  Q30M  PRN


 IV


 Hypoglycemia  3/5/20 07:00


 4/4/20 06:59   


 


 


 Ertapenem 1 gm/


 Sodium Chloride  55 ml @ 


 110 mls/hr  Q24H


 IVPB


   3/7/20 13:00


 3/12/20 12:59  3/7/20 13:24


 


 


 Heparin Sodium


  (Porcine)


  (Heparin 5000


 units/ml)  5,000 units  EVERY 12  HOURS


 SUBQ


   3/5/20 21:00


 4/4/20 20:59   


 


 


 Insulin Aspart


  (NovoLOG)    BEFORE MEALS AND  HS


 SUBQ


   3/5/20 11:30


 4/4/20 11:29  3/6/20 11:23


 


 


 Oseltamivir


 Phosphate


  (Tamiflu)  30 mg  DAILY


 ORAL


   3/6/20 09:00


 3/10/20 08:59  3/7/20 09:32


 

















Abrahan Zhao MD Mar 7, 2020 18:27

## 2020-03-07 NOTE — PROGRESS NOTE
DATE:  03/07/2020

CARDIOLOGY PROGRESS NOTE



SUBJECTIVE:  No chest pain and less congestion noted.



PHYSICAL EXAMINATION:

VITAL SIGNS:  Blood pressure 137/68, pulse 91, respiratory rate 20.

LUNGS:  Few rhonchi.  Nasal discharge.

HEART:  Regular rhythm and rate.  Normal S1, S2.

ABDOMEN:  Soft.  No edema.



Urine culture is positive for ESBL E. coli.



2D echocardiogram reviewed notable for degenerative aortic valve

disease.



IMPRESSION:

1. Influenza A.

2. Upper respiratory infection.

3. Multidrug resistant ESBL E. coli infection of the urine.

4. Sinus tachycardia secondary to acute infection, now resolving

dementia with agitation.

5. Type 2 diabetes mellitus.

6. Atherosclerotic heart disease.

7. 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



PLAN:

1. No need for afterload reduction at this time.  Continue antiviral and

antibiotic therapy per primary care physician.

2. EKG to be reviewed to confirm cardiac rhythm.









  ______________________________________________

  Conor Patterson M.D.





DR:  DANDY

D:  03/07/2020 21:56

T:  03/07/2020 22:56

JOB#:  [id number]

CC:

## 2020-03-07 NOTE — PROGRESS NOTE
DATE:  03/06/2020

CARDIOLOGY PROGRESS NOTE



SUBJECTIVE:  Uncooperative at times.  Refused her Tamiflu earlier today.

Took the afternoon dose.  On IV antimicrobials.  Urine culture positive.



OBJECTIVE:

VITAL SIGNS:  Blood pressure 142/82, heart rate 107, and respiratory rate

20.

LUNGS:  Few rhonchi.

CARDIAC:  Regular rhythm.  Rapid rate.  Normal S1 and S2.

ABDOMEN:  Soft.

EXTREMITIES:  Trace edema.



IMPRESSION:

1. Influenza A.

2. Urinary tract infection.

3. Type 2 diabetes mellitus.

4. Dementia with agitation.

5. Sinus tachycardia.



PLAN:

1. Antiviral and antibiotic therapy.

2. Await final urine culture.

3. Echocardiogram.

4. Consider further cardiac therapy for rapid heart rate.

5. EKG will be reviewed.

6. Discontinue bronchodilators.









  ______________________________________________

  Conor Patterson M.D.





DR:  NIKKY

D:  03/07/2020 01:09

T:  03/07/2020 01:20

JOB#:  [id number]

CC:

## 2020-03-08 VITALS — DIASTOLIC BLOOD PRESSURE: 74 MMHG | SYSTOLIC BLOOD PRESSURE: 131 MMHG

## 2020-03-08 VITALS — DIASTOLIC BLOOD PRESSURE: 69 MMHG | SYSTOLIC BLOOD PRESSURE: 121 MMHG

## 2020-03-08 VITALS — DIASTOLIC BLOOD PRESSURE: 58 MMHG | SYSTOLIC BLOOD PRESSURE: 115 MMHG

## 2020-03-08 VITALS — SYSTOLIC BLOOD PRESSURE: 115 MMHG | DIASTOLIC BLOOD PRESSURE: 57 MMHG

## 2020-03-08 VITALS — SYSTOLIC BLOOD PRESSURE: 122 MMHG | DIASTOLIC BLOOD PRESSURE: 66 MMHG

## 2020-03-08 VITALS — SYSTOLIC BLOOD PRESSURE: 119 MMHG | DIASTOLIC BLOOD PRESSURE: 61 MMHG

## 2020-03-08 RX ADMIN — ERTAPENEM SODIUM SCH MLS/HR: 1 INJECTION, POWDER, LYOPHILIZED, FOR SOLUTION INTRAMUSCULAR; INTRAVENOUS at 14:06

## 2020-03-08 RX ADMIN — INSULIN ASPART SCH UNITS: 100 INJECTION, SOLUTION INTRAVENOUS; SUBCUTANEOUS at 11:30

## 2020-03-08 RX ADMIN — DEXTROSE AND SODIUM CHLORIDE SCH MLS/HR: 5; .45 INJECTION, SOLUTION INTRAVENOUS at 15:48

## 2020-03-08 RX ADMIN — INSULIN ASPART SCH UNITS: 100 INJECTION, SOLUTION INTRAVENOUS; SUBCUTANEOUS at 16:30

## 2020-03-08 RX ADMIN — HEPARIN SODIUM SCH UNITS: 5000 INJECTION INTRAVENOUS; SUBCUTANEOUS at 20:38

## 2020-03-08 RX ADMIN — HEPARIN SODIUM SCH UNITS: 5000 INJECTION INTRAVENOUS; SUBCUTANEOUS at 08:57

## 2020-03-08 RX ADMIN — INSULIN ASPART SCH UNITS: 100 INJECTION, SOLUTION INTRAVENOUS; SUBCUTANEOUS at 06:11

## 2020-03-08 RX ADMIN — INSULIN ASPART SCH UNITS: 100 INJECTION, SOLUTION INTRAVENOUS; SUBCUTANEOUS at 20:40

## 2020-03-08 NOTE — GENERAL PROGRESS NOTE
Assessment/Plan


Problem List:  


(1) UTI (urinary tract infection)


ICD Codes:  N39.0 - Urinary tract infection, site not specified


SNOMED:  59774346


(2) Encephalopathy


ICD Codes:  G93.40 - Encephalopathy, unspecified


SNOMED:  38004827


(3) Diabetes mellitus


ICD Codes:  E11.9 - Type 2 diabetes mellitus without complications


SNOMED:  20304744


(4) Sepsis


ICD Codes:  A41.9 - Sepsis, unspecified organism


SNOMED:  57561837


(5) Acute encephalopathy


ICD Codes:  G93.40 - Encephalopathy, unspecified


SNOMED:  3083462


(6) Influenza


ICD Codes:  J11.1 - Influenza due to unidentified influenza virus with other 

respiratory manifestations


SNOMED:  1820851


(7) Pneumonia


ICD Codes:  J18.9 - Pneumonia, unspecified organism


SNOMED:  527213720


Status:  stable, progressing


Assessment/Plan:


tamiflu


resp care


tylenol


ivf 


iv abx. 


id consult pending. 


follow up cultures


anxiolytics


compliance stressed





Subjective


ROS Limited/Unobtainable:  No


Constitutional:  Reports: malaise, weakness


HEENT:  Reports: no symptoms


Cardiovascular:  Reports: no symptoms


Respiratory:  Reports: cough


Gastrointestinal/Abdominal:  Reports: no symptoms


Genitourinary:  Reports: no symptoms


Neurologic/Psychiatric:  Reports: anxiety


Endocrine:  Reports: no symptoms


Hematologic/Lymphatic:  Reports: anemia


Allergies:  


Coded Allergies:  


     No Known Allergies (Unverified , 11/21/16)


All Systems:  reviewed and negative except above


Subjective


no events. resting. no complaints. no fever or chills. no chest pain


on iv abx. .on ivf. more cooperative with care





Objective





Last 24 Hour Vital Signs








  Date Time  Temp Pulse Resp B/P (MAP) Pulse Ox O2 Delivery O2 Flow Rate FiO2


 


3/8/20 09:00      Room Air  


 


3/8/20 08:00 98.9 80 17 115/57 (76) 98   


 


3/8/20 04:00 98.0 88 20 122/66 (84) 97   


 


3/8/20 00:00 97.5 87 18 115/58 (77) 95   


 


3/7/20 21:00      Room Air  


 


3/7/20 20:00 98.0 92 19 138/69 (92) 96   


 


3/7/20 16:00 98.1 91 20 137/68 (91) 96   


 


3/7/20 12:00 97.4 86 18 127/64 (85) 97   

















Intake and Output  


 


 3/7/20 3/8/20





 19:00 07:00


 


Intake Total 710 ml 260 ml


 


Balance 710 ml 260 ml


 


  


 


Intake Oral 600 ml 260 ml


 


IV Total 110 ml 


 


# Voids 5 2








Height (Feet):  5


Height (Inches):  0.00


Weight (Pounds):  134


Objective


General Appearance:  WD/WN, alert, confused


Neck:  supple


Cardiovascular:  normal rate, regular rhythm


Respiratory/Chest:  chest wall non-tender, lungs clear, normal breath sounds, 

no respiratory distress, no accessory muscle use


Abdomen:  normal bowel sounds, non tender, soft, no organomegaly, no mass


Edema:  no edema noted Arm (L), no edema noted Arm (R), no edema noted Leg (L), 

no edema noted Leg (R), no edema noted Pedal (L), no edema noted Pedal (R), no 

edema noted Generalized


Neurologic:  alert











Michael Hernández MD Mar 8, 2020 10:44

## 2020-03-08 NOTE — CONSULTATION
DATE OF CONSULTATION:  03/08/2020

INFECTIOUS DISEASES CONSULTATION



This consult is for coverage of Dr. Patel



CONSULTING PHYSICIAN:  Ryan Jasso M.D.



PRIMARY ATTENDING PHYSICIAN:  Michael Hernández M.D.



REASON FOR CONSULTATION:  UTI and influenza A.



HISTORY OF PRESENT ILLNESS:  This is an 89-year-old  female, admitted

on March 4, 2020, from a skilled nursing facility because of fever, cough,

congestion, and mild hypoxemia and influenza A test become positive in

nursing facility, also has bacteriuria.  The urine culture become positive

for ESBL E. coli.



PAST MEDICAL HISTORY:  Significant for diabetes mellitus, hypertension,

chronic kidney disease, anemia, dementia, history of CVA.



ALLERGIES:  No known drug allergies.



MEDICATIONS:  On Ertapenem  , Tamiflu, insulin.



SOCIAL HISTORY:  .  Nursing home resident.  No history of alcohol,

drug abuse, and smoking.



REVIEW OF SYSTEMS:  Very limited.



CODE STATUS:  DNR and DNI.



PHYSICAL EXAMINATION:

VITAL SIGNS:  Temperature 98.9, pulse 80, blood pressure 115/57.

GENERAL APPEARANCE:  No acute distress.  Seems to have normal

weight.

HEAD AND NECK:  Pink conjunctiva.

HEART:  Normal rate.

LUNGS:  Clear.

ABDOMEN:  Soft, nontender.

EXTREMITIES:  Has no edema.

NEUROLOGIC:  Open eyes, responsive.



LABORATORY DATA:  WBC 4.2, hemoglobin 12.7, hematocrit 36.3, platelet is

113.  Sodium 140, potassium 4.5, chloride 109, bicarb 21, BUN 25,

creatinine 1.2, glucose is 362.  Albumin is 2.6.  Urine culture grew ESBL

E coli, sensitive to Bactrim, nitrofurantoin, and Zosyn as well as

ertapenem.  Blood culture x2 are negative.  MRSA screen negative.  VRE

screening negative.  Influenza A and B in the hospital are negative.



IMPRESSION:

1. UTI with E. coli ESBL.

2. Influenza A.

3. Diabetes mellitus.

4. Hypertension.

5. Chronic kidney disease.

6. Thrombocytopenia.

7. Dementia.



RECOMMENDATION:  Continue Tamiflu for one more day.  Continue Ertapenem for

more one day.  Check hemoglobin A1c.



At the end of my exam, I thank Dr. Hernández for involving me in the care

of this patient.









  ______________________________________________

  Ryan Jasso M.D.





DR:  SANTOS

D:  03/08/2020 10:48

T:  03/09/2020 00:45

JOB#:  [id number]

CC:



JESSICA

## 2020-03-08 NOTE — PULMONOLOGY PROGRESS NOTE
Assessment/Plan


Assessment/Plan


IMPRESSION:


1. Influenza A.


2. Mild pulmonary edema.


3. Hypertension.


4. DNR.





DISCUSSION: Continue Tamiflu.    Continue breathing treatments.


I will follow as needed.  











  ______________________________________________


  Abrahan Zhao M.D.





Subjective


Interval Events:  None new


Constitutional:  Reports: no symptoms


HEENT:  Repors: no symptoms


Respiratory:  Reports: no symptoms


Cardiovascular:  Reports: no symptoms


Gastrointestinal/Abdominal:  Reports: no symptoms


Allergies:  


Coded Allergies:  


     No Known Allergies (Unverified , 11/21/16)





Objective





Last 24 Hour Vital Signs








  Date Time  Temp Pulse Resp B/P (MAP) Pulse Ox O2 Delivery O2 Flow Rate FiO2


 


3/8/20 04:00 98.0 88 20 122/66 (84) 97   


 


3/8/20 00:00 97.5 87 18 115/58 (77) 95   


 


3/7/20 21:00      Room Air  


 


3/7/20 20:00 98.0 92 19 138/69 (92) 96   


 


3/7/20 16:00 98.1 91 20 137/68 (91) 96   


 


3/7/20 12:00 97.4 86 18 127/64 (85) 97   


 


3/7/20 09:00      Room Air  


 


3/7/20 08:00 98.0 92 20 139/75 (96) 96   

















Intake and Output  


 


 3/7/20 3/8/20





 19:00 07:00


 


Intake Total 710 ml 260 ml


 


Balance 710 ml 260 ml


 


  


 


Intake Oral 600 ml 260 ml


 


IV Total 110 ml 


 


# Voids 5 2








General Appearance:  no acute distress


HEENT:  normocephalic


Respiratory/Chest:  chest wall non-tender


Cardiovascular:  normal peripheral pulses


Abdomen:  normal bowel sounds





Current Medications








 Medications


  (Trade)  Dose


 Ordered  Sig/Vania


 Route


 PRN Reason  Start Time


 Stop Time Status Last Admin


Dose Admin


 


 Dextrose


  (Dextrose 50%)  25 ml  Q30M  PRN


 IV


 Hypoglycemia  3/5/20 07:00


 4/4/20 06:59   


 


 


 Dextrose


  (Dextrose 50%)  50 ml  Q30M  PRN


 IV


 Hypoglycemia  3/5/20 07:00


 4/4/20 06:59   


 


 


 Ertapenem 1 gm/


 Sodium Chloride  55 ml @ 


 110 mls/hr  Q24H


 IVPB


   3/7/20 13:00


 3/12/20 12:59  3/7/20 13:24


 


 


 Heparin Sodium


  (Porcine)


  (Heparin 5000


 units/ml)  5,000 units  EVERY 12  HOURS


 SUBQ


   3/5/20 21:00


 4/4/20 20:59   


 


 


 Insulin Aspart


  (NovoLOG)    BEFORE MEALS AND  HS


 SUBQ


   3/5/20 11:30


 4/4/20 11:29  3/6/20 11:23


 


 


 Oseltamivir


 Phosphate


  (Tamiflu)  30 mg  DAILY


 ORAL


   3/6/20 09:00


 3/10/20 08:59  3/7/20 09:32


 

















Abrahan Zhao MD Mar 8, 2020 07:51

## 2020-03-09 VITALS — SYSTOLIC BLOOD PRESSURE: 131 MMHG | DIASTOLIC BLOOD PRESSURE: 86 MMHG

## 2020-03-09 VITALS — DIASTOLIC BLOOD PRESSURE: 59 MMHG | SYSTOLIC BLOOD PRESSURE: 130 MMHG

## 2020-03-09 VITALS — DIASTOLIC BLOOD PRESSURE: 18 MMHG | SYSTOLIC BLOOD PRESSURE: 125 MMHG

## 2020-03-09 VITALS — SYSTOLIC BLOOD PRESSURE: 131 MMHG | DIASTOLIC BLOOD PRESSURE: 69 MMHG

## 2020-03-09 VITALS — DIASTOLIC BLOOD PRESSURE: 88 MMHG | SYSTOLIC BLOOD PRESSURE: 128 MMHG

## 2020-03-09 RX ADMIN — HEPARIN SODIUM SCH UNITS: 5000 INJECTION INTRAVENOUS; SUBCUTANEOUS at 08:25

## 2020-03-09 RX ADMIN — DEXTROSE AND SODIUM CHLORIDE SCH MLS/HR: 5; .45 INJECTION, SOLUTION INTRAVENOUS at 05:52

## 2020-03-09 RX ADMIN — DEXTROSE AND SODIUM CHLORIDE SCH MLS/HR: 5; .45 INJECTION, SOLUTION INTRAVENOUS at 19:20

## 2020-03-09 RX ADMIN — INSULIN ASPART SCH UNITS: 100 INJECTION, SOLUTION INTRAVENOUS; SUBCUTANEOUS at 20:49

## 2020-03-09 RX ADMIN — INSULIN ASPART SCH UNITS: 100 INJECTION, SOLUTION INTRAVENOUS; SUBCUTANEOUS at 05:53

## 2020-03-09 RX ADMIN — ERTAPENEM SODIUM SCH MLS/HR: 1 INJECTION, POWDER, LYOPHILIZED, FOR SOLUTION INTRAMUSCULAR; INTRAVENOUS at 12:10

## 2020-03-09 RX ADMIN — INSULIN ASPART SCH UNITS: 100 INJECTION, SOLUTION INTRAVENOUS; SUBCUTANEOUS at 12:10

## 2020-03-09 RX ADMIN — INSULIN ASPART SCH UNITS: 100 INJECTION, SOLUTION INTRAVENOUS; SUBCUTANEOUS at 16:30

## 2020-03-09 RX ADMIN — HEPARIN SODIUM SCH UNITS: 5000 INJECTION INTRAVENOUS; SUBCUTANEOUS at 20:48

## 2020-03-09 NOTE — PULMONOLOGY PROGRESS NOTE
Assessment/Plan


Assessment/Plan


IMPRESSION:


1. Influenza A.


2. Mild pulmonary edema.


3. Hypertension.


4. DNR.





DISCUSSION: Continue Tamiflu.    Continue breathing treatments.


I will follow as needed.  











  ______________________________________________


  Abrahan Zhao M.D.





Subjective


Interval Events:  none new


Constitutional:  Reports: no symptoms


HEENT:  Repors: no symptoms


Respiratory:  Reports: no symptoms


Cardiovascular:  Reports: no symptoms


Gastrointestinal/Abdominal:  Reports: no symptoms


Allergies:  


Coded Allergies:  


     No Known Allergies (Unverified , 11/21/16)





Objective





Last 24 Hour Vital Signs








  Date Time  Temp Pulse Resp B/P (MAP) Pulse Ox O2 Delivery O2 Flow Rate FiO2


 


3/9/20 12:00 98.5 81 17 130/59 (82) 96   


 


3/9/20 09:00      Room Air  


 


3/9/20 08:00 98.4 80 18 125/58 (80) 95   


 


3/9/20 04:00 97.4 86 18 128/88 (101) 97   


 


3/9/20 00:00 97.4 93 20 131/86 (101) 96   


 


3/8/20 21:00      Room Air  


 


3/8/20 20:00 97.9 90 19 131/74 (93) 96   


 


3/8/20 16:00 98.0 81 17 121/69 (86) 97   

















Intake and Output  


 


 3/8/20 3/9/20





 19:00 07:00


 


Intake Total 935 ml 900 ml


 


Balance 935 ml 900 ml


 


  


 


IV Total 335 ml 900 ml


 


Other 600 ml 


 


# Voids  2








General Appearance:  no acute distress


HEENT:  normocephalic


Respiratory/Chest:  chest wall non-tender


Cardiovascular:  normal peripheral pulses


Abdomen:  normal bowel sounds


Laboratory Tests


3/9/20 05:50: Hemoglobin A1c 7.5H





Current Medications








 Medications


  (Trade)  Dose


 Ordered  Sig/Vania


 Route


 PRN Reason  Start Time


 Stop Time Status Last Admin


Dose Admin


 


 Dextrose


  (Dextrose 50%)  25 ml  Q30M  PRN


 IV


 Hypoglycemia  3/5/20 07:00


 4/4/20 06:59   


 


 


 Dextrose


  (Dextrose 50%)  50 ml  Q30M  PRN


 IV


 Hypoglycemia  3/5/20 07:00


 4/4/20 06:59   


 


 


 Dextrose/Sodium


 Chloride  1,000 ml @ 


 75 mls/hr  [id number]


 IV


   3/8/20 15:41


 4/7/20 15:40  3/9/20 05:52


 


 


 Ertapenem 1 gm/


 Sodium Chloride  55 ml @ 


 110 mls/hr  Q24H


 IVPB


   3/7/20 13:00


 3/12/20 12:59  3/9/20 12:10


 


 


 Heparin Sodium


  (Porcine)


  (Heparin 5000


 units/ml)  5,000 units  EVERY 12  HOURS


 SUBQ


   3/5/20 21:00


 4/4/20 20:59   


 


 


 Insulin Aspart


  (NovoLOG)    BEFORE MEALS AND  HS


 SUBQ


   3/5/20 11:30


 4/4/20 11:29  3/9/20 12:10


 

















Abrahan Zhao MD Mar 9, 2020 14:25

## 2020-03-09 NOTE — GENERAL PROGRESS NOTE
Assessment/Plan


Problem List:  


(1) UTI (urinary tract infection)


ICD Codes:  N39.0 - Urinary tract infection, site not specified


SNOMED:  15036298


(2) Encephalopathy


ICD Codes:  G93.40 - Encephalopathy, unspecified


SNOMED:  79042999


(3) Diabetes mellitus


ICD Codes:  E11.9 - Type 2 diabetes mellitus without complications


SNOMED:  33223160


(4) Sepsis


ICD Codes:  A41.9 - Sepsis, unspecified organism


SNOMED:  82884943


(5) Acute encephalopathy


ICD Codes:  G93.40 - Encephalopathy, unspecified


SNOMED:  5709178


(6) Influenza


ICD Codes:  J11.1 - Influenza due to unidentified influenza virus with other 

respiratory manifestations


SNOMED:  0675667


(7) Pneumonia


ICD Codes:  J18.9 - Pneumonia, unspecified organism


SNOMED:  031180013


Status:  stable, progressing


Assessment/Plan:


tamiflu dcd


resp care


tylenol


ivf 


iv abx. 


id consult appreciated


follow up cultures


anxiolytics


compliance stressed


snf can accept pt back tomorrow





Subjective


ROS Limited/Unobtainable:  No


Constitutional:  Reports: malaise, weakness


HEENT:  Reports: no symptoms


Cardiovascular:  Reports: no symptoms


Respiratory:  Reports: cough, shortness of breath, sputum


Gastrointestinal/Abdominal:  Reports: no symptoms


Genitourinary:  Reports: no symptoms


Neurologic/Psychiatric:  Reports: no symptoms


Endocrine:  Reports: no symptoms


Hematologic/Lymphatic:  Reports: no symptoms


Allergies:  


Coded Allergies:  


     No Known Allergies (Unverified , 11/21/16)


All Systems:  reviewed and negative except above


Subjective


no events. resting. no complaints. no fever or chills. no chest pain


on iv abx. more cooperative with care. ID and pulm appreciated.





Objective





Last 24 Hour Vital Signs








  Date Time  Temp Pulse Resp B/P (MAP) Pulse Ox O2 Delivery O2 Flow Rate FiO2


 


3/9/20 16:00 97.9 79 17 131/69 (89) 17   


 


3/9/20 12:00 98.5 81 17 130/59 (82) 96   


 


3/9/20 09:00      Room Air  


 


3/9/20 08:00 98.4 80 18 125/58 (80) 95   


 


3/9/20 04:00 97.4 86 18 128/88 (101) 97   


 


3/9/20 00:00 97.4 93 20 131/86 (101) 96   


 


3/8/20 21:00      Room Air  


 


3/8/20 20:00 97.9 90 19 131/74 (93) 96   

















Intake and Output  


 


 3/8/20 3/9/20





 19:00 07:00


 


Intake Total 935 ml 900 ml


 


Balance 935 ml 900 ml


 


  


 


IV Total 335 ml 900 ml


 


Other 600 ml 


 


# Voids  2








Laboratory Tests


3/9/20 05:50: Hemoglobin A1c 7.5H


Height (Feet):  5


Height (Inches):  0.00


Weight (Pounds):  134


Objective


General Appearance:  WD/WN, alert, confused


Neck:  supple


Cardiovascular:  normal rate, regular rhythm


Respiratory/Chest:  chest wall non-tender, lungs clear, normal breath sounds, 

no respiratory distress, no accessory muscle use


Abdomen:  normal bowel sounds, non tender, soft, no organomegaly, no mass


Edema:  no edema noted Arm (L), no edema noted Arm (R), no edema noted Leg (L), 

no edema noted Leg (R), no edema noted Pedal (L), no edema noted Pedal (R), no 

edema noted Generalized


Neurologic:  alert











Michael Hernández MD Mar 9, 2020 17:16

## 2020-03-09 NOTE — INFECTIOUS DISEASES PROG NOTE
Assessment/Plan


Assessment/Plan


antibiotics : ertapenem, tamiflu





A


1. e.coli esbl UTI


2. influenza A URI s/p rx


3. diabetes mellitus


4. hypertension


5. CVA


6. dementia





P


1. continue ertapenem 4 more days


2. d/c tamiflu


3. will follow up cultures





Subjective


ROS Limited/Unobtainable:  Yes


Allergies:  


Coded Allergies:  


     No Known Allergies (Unverified , 11/21/16)





Objective


Vital Signs





Last 24 Hour Vital Signs








  Date Time  Temp Pulse Resp B/P (MAP) Pulse Ox O2 Delivery O2 Flow Rate FiO2


 


3/9/20 09:00      Room Air  


 


3/9/20 08:00 98.4 80 18 125/18 (53) 95   


 


3/9/20 04:00 97.4 86 18 128/88 (101) 97   


 


3/9/20 00:00 97.4 93 20 131/86 (101) 96   


 


3/8/20 21:00      Room Air  


 


3/8/20 20:00 97.9 90 19 131/74 (93) 96   


 


3/8/20 16:00 98.0 81 17 121/69 (86) 97   


 


3/8/20 12:00 97.8 82 17 119/61 (80) 98   








Height (Feet):  5


Height (Inches):  0.00


Weight (Pounds):  134


Respiratory/Chest:  lungs clear


Cardiovascular:  normal rate, regular rhythm, no gallop/murmur


Abdomen:  soft, non tender


Extremities:  no edema





Laboratory Tests








Test


  3/9/20


05:50


 


Hemoglobin A1c


  7.5 %


(4.3-6.0)  H











Current Medications








 Medications


  (Trade)  Dose


 Ordered  Sig/Vania


 Route


 PRN Reason  Start Time


 Stop Time Status Last Admin


Dose Admin


 


 Dextrose


  (Dextrose 50%)  25 ml  Q30M  PRN


 IV


 Hypoglycemia  3/5/20 07:00


 4/4/20 06:59   


 


 


 Dextrose


  (Dextrose 50%)  50 ml  Q30M  PRN


 IV


 Hypoglycemia  3/5/20 07:00


 4/4/20 06:59   


 


 


 Dextrose/Sodium


 Chloride  1,000 ml @ 


 75 mls/hr  [id number]


 IV


   3/8/20 15:41


 4/7/20 15:40  3/9/20 05:52


 


 


 Ertapenem 1 gm/


 Sodium Chloride  55 ml @ 


 110 mls/hr  Q24H


 IVPB


   3/7/20 13:00


 3/12/20 12:59  3/8/20 14:06


 


 


 Heparin Sodium


  (Porcine)


  (Heparin 5000


 units/ml)  5,000 units  EVERY 12  HOURS


 SUBQ


   3/5/20 21:00


 4/4/20 20:59   


 


 


 Insulin Aspart


  (NovoLOG)    BEFORE MEALS AND  HS


 SUBQ


   3/5/20 11:30


 4/4/20 11:29  3/9/20 05:53


 


 


 Oseltamivir


 Phosphate


  (Tamiflu)  30 mg  DAILY


 ORAL


   3/6/20 09:00


 3/10/20 08:59  3/9/20 08:20


 

















Adonis Patel MD Mar 9, 2020 10:47

## 2020-03-09 NOTE — PROGRESS NOTE
DATE:  03/08/2020

CARDIOLOGY PROGRESS NOTE



SUBJECTIVE:  The patient with no new complaints.  On antimicrobials.  She

has a multi-drug resistant E. coli urinary infection as well as influenza

now.



OBJECTIVE:

VITAL SIGNS:  Blood pressure 115/57, heart rate 80, and respiratory rate

17.

LUNGS:  Bilateral breath sounds.

HEART:  Regular rhythm and rate.  Normal S1 and S2.

ABDOMEN:  Soft.

EXTREMITIES:  No edema.



IMPRESSION:

1. Stabilizing cardiovascular parameters with resolution of

tachycardia.

2. Compensated degenerative aortic valve disease.



PLAN:

1. No additional cardiovascular therapy.

2. EKG to be reviewed.

3. Antimicrobials per primary care physician.









  ______________________________________________

  Conor Patterson M.D.





DR:  NIKKY

D:  03/09/2020 02:13

T:  03/09/2020 02:37

JOB#:  [id number]

CC:

## 2020-03-10 VITALS — SYSTOLIC BLOOD PRESSURE: 137 MMHG | DIASTOLIC BLOOD PRESSURE: 80 MMHG

## 2020-03-10 VITALS — DIASTOLIC BLOOD PRESSURE: 78 MMHG | SYSTOLIC BLOOD PRESSURE: 111 MMHG

## 2020-03-10 VITALS — SYSTOLIC BLOOD PRESSURE: 134 MMHG | DIASTOLIC BLOOD PRESSURE: 73 MMHG

## 2020-03-10 VITALS — DIASTOLIC BLOOD PRESSURE: 75 MMHG | SYSTOLIC BLOOD PRESSURE: 132 MMHG

## 2020-03-10 RX ADMIN — INSULIN ASPART SCH UNITS: 100 INJECTION, SOLUTION INTRAVENOUS; SUBCUTANEOUS at 16:30

## 2020-03-10 RX ADMIN — INSULIN ASPART SCH UNITS: 100 INJECTION, SOLUTION INTRAVENOUS; SUBCUTANEOUS at 11:30

## 2020-03-10 RX ADMIN — HEPARIN SODIUM SCH UNITS: 5000 INJECTION INTRAVENOUS; SUBCUTANEOUS at 08:22

## 2020-03-10 RX ADMIN — ERTAPENEM SODIUM SCH MLS/HR: 1 INJECTION, POWDER, LYOPHILIZED, FOR SOLUTION INTRAMUSCULAR; INTRAVENOUS at 12:13

## 2020-03-10 RX ADMIN — INSULIN ASPART SCH UNITS: 100 INJECTION, SOLUTION INTRAVENOUS; SUBCUTANEOUS at 06:30

## 2020-03-10 NOTE — PROGRESS NOTE
DATE:  03/09/2020

CARDIOLOGY PROGRESS NOTE



SUBJECTIVE:  No events.  Discharge plan when skilled nursing facility able

to readmit patient due to influenza, infestation.



OBJECTIVE:

VITAL SIGNS:  The patient's blood pressure parameters well controlled at

131/69, pulse rate 79, and respirations 17.

LUNGS:  Clear.  No wheezing.

CARDIAC:  Regular rhythm and rate.  Normal S1 and S2.

ABDOMEN:  Soft.

EXTREMITIES:  No edema.



LABORATORY DATA:  Sodium 140, potassium 4.5, BUN 25, creatinine 1.2,

albumin 3.6.  B12, folate normal.



IMPRESSION:

1. Influenza A.

2. Acute renal failure, improved.

3. Moderate protein-calorie malnutrition.

4. Hypertensive heart disease.



PLAN:

1. Off antiviral therapy.

2. Continue hydration and monitor cardiorenal parameters and volume

status.

3. Nutritional support.









  ______________________________________________

  Conor Patterson M.D.





DR:  SAMANTHA

D:  03/10/2020 03:07

T:  03/10/2020 04:27

JOB#:  [id number]

CC:

## 2020-03-10 NOTE — INFECTIOUS DISEASES PROG NOTE
Assessment/Plan


Assessment/Plan


antibiotics : ertapenem





A


1. e.coli esbl UTI


2. influenza A URI s/p rx


3. diabetes mellitus


4. hypertension


5. CVA


6. dementia





P


1. continue ertapenem 3 more days


2. will follow up cultures





Subjective


ROS Limited/Unobtainable:  Yes


Allergies:  


Coded Allergies:  


     No Known Allergies (Unverified , 11/21/16)





Objective


Vital Signs





Last 24 Hour Vital Signs








  Date Time  Temp Pulse Resp B/P (MAP) Pulse Ox O2 Delivery O2 Flow Rate FiO2


 


3/10/20 08:33      Room Air  


 


3/10/20 08:00  80 16 111/78 (89)    


 


3/10/20 04:00 98.4  18 132/75 (94) 96   


 


3/10/20 00:00 97.5  20 134/73 (93) 95   


 


3/9/20 21:00      Room Air  


 


3/9/20 16:00 97.9 79 17 131/69 (89) 17   








Height (Feet):  5


Height (Inches):  0.00


Weight (Pounds):  134


Respiratory/Chest:  lungs clear


Cardiovascular:  normal rate, regular rhythm, no gallop/murmur


Abdomen:  soft, non tender


Extremities:  no edema





Current Medications








 Medications


  (Trade)  Dose


 Ordered  Sig/Vania


 Route


 PRN Reason  Start Time


 Stop Time Status Last Admin


Dose Admin


 


 Dextrose


  (Dextrose 50%)  25 ml  Q30M  PRN


 IV


 Hypoglycemia  3/5/20 07:00


 4/4/20 06:59   


 


 


 Dextrose


  (Dextrose 50%)  50 ml  Q30M  PRN


 IV


 Hypoglycemia  3/5/20 07:00


 4/4/20 06:59   


 


 


 Dextrose/Sodium


 Chloride  1,000 ml @ 


 50 mls/hr  Q20H


 IV


   3/10/20 03:30


 4/9/20 03:29  3/10/20 07:03


 


 


 Ertapenem 1 gm/


 Sodium Chloride  55 ml @ 


 110 mls/hr  Q24H


 IVPB


   3/7/20 13:00


 3/12/20 12:59  3/9/20 12:10


 


 


 Heparin Sodium


  (Porcine)


  (Heparin 5000


 units/ml)  5,000 units  EVERY 12  HOURS


 SUBQ


   3/5/20 21:00


 4/4/20 20:59   


 


 


 Insulin Aspart


  (NovoLOG)    BEFORE MEALS AND  HS


 SUBQ


   3/5/20 11:30


 4/4/20 11:29  3/9/20 12:10


 

















Adonis Patel MD Mar 10, 2020 12:00

## 2020-03-10 NOTE — PULMONOLOGY PROGRESS NOTE
Assessment/Plan


Assessment/Plan


IMPRESSION:


1. Influenza A.


2. Mild pulmonary edema.


3. Hypertension.


4. DNR.





DISCUSSION: Continue Tamiflu.    Continue breathing treatments.


I will follow as needed.  











  ______________________________________________


  Abrahan Zhao M.D.





Subjective


Interval Events:  None new


Constitutional:  Reports: no symptoms


HEENT:  Repors: no symptoms


Respiratory:  Reports: no symptoms


Cardiovascular:  Reports: no symptoms


Gastrointestinal/Abdominal:  Reports: no symptoms


Genitourinary:  Reports: no symptoms


Allergies:  


Coded Allergies:  


     No Known Allergies (Unverified , 11/21/16)





Objective





Last 24 Hour Vital Signs








  Date Time  Temp Pulse Resp B/P (MAP) Pulse Ox O2 Delivery O2 Flow Rate FiO2


 


3/10/20 08:33      Room Air  


 


3/10/20 08:00  80 16 111/78 (89)    


 


3/10/20 04:00 98.4  18 132/75 (94) 96   


 


3/10/20 00:00 97.5  20 134/73 (93) 95   


 


3/9/20 21:00      Room Air  


 


3/9/20 16:00 97.9 79 17 131/69 (89) 17   

















Intake and Output  


 


 3/9/20 3/10/20





 19:00 07:00


 


Intake Total 1600 ml 


 


Balance 1600 ml 


 


  


 


Other 1600 ml 


 


# Voids  2


 


# Bowel Movements  2








General Appearance:  no acute distress


HEENT:  normocephalic


Respiratory/Chest:  chest wall non-tender, lungs clear


Cardiovascular:  normal peripheral pulses


Abdomen:  normal bowel sounds





Current Medications








 Medications


  (Trade)  Dose


 Ordered  Sig/Vania


 Route


 PRN Reason  Start Time


 Stop Time Status Last Admin


Dose Admin


 


 Dextrose


  (Dextrose 50%)  25 ml  Q30M  PRN


 IV


 Hypoglycemia  3/5/20 07:00


 4/4/20 06:59   


 


 


 Dextrose


  (Dextrose 50%)  50 ml  Q30M  PRN


 IV


 Hypoglycemia  3/5/20 07:00


 4/4/20 06:59   


 


 


 Dextrose/Sodium


 Chloride  1,000 ml @ 


 50 mls/hr  Q20H


 IV


   3/10/20 03:30


 4/9/20 03:29  3/10/20 07:03


 


 


 Ertapenem 1 gm/


 Sodium Chloride  55 ml @ 


 110 mls/hr  Q24H


 IVPB


   3/7/20 13:00


 3/12/20 12:59  3/9/20 12:10


 


 


 Heparin Sodium


  (Porcine)


  (Heparin 5000


 units/ml)  5,000 units  EVERY 12  HOURS


 SUBQ


   3/5/20 21:00


 4/4/20 20:59   


 


 


 Insulin Aspart


  (NovoLOG)    BEFORE MEALS AND  HS


 SUBQ


   3/5/20 11:30


 4/4/20 11:29  3/9/20 12:10


 

















Abrahan Zhao MD Mar 10, 2020 12:03

## 2020-03-11 NOTE — CONSULTATION
DATE OF CONSULTATION:  03/04/2020

CONSULTING PHYSICIAN:  Janeth Montiel M.D.



HISTORY OF PRESENT ILLNESS:  The patient is an 89-year-old female with a

history of multiple medical issues, who has been admitted to the hospital

for medical stabilization.  The patient has a history of chronic kidney

disease, diabetes, stroke, congestive heart failure.  The patient was

going to be discharged today.  Upon discharge, the patient refused the

vitals, refused to take medication, refused body temperature or blood

pressure started yelling, screaming and was uncooperative.  The

patient is confused, unable to provide any meaningful history.  Poor

insight and poor memory, was agitated, screaming.



PAST PSYCHIATRIC HISTORY:  Dementia.  Before coming to the hospital, the

patient was on Abilify.



PAST MEDICAL HISTORY:  Significant for chronic kidney disease,

hypertension, diabetes, stroke, congestive heart failure.



ALLERGIES:  No known drug allergies.



SUBSTANCE ABUSE HISTORY:  No history of illicit drug use or

alcohol.



MENTAL STATUS EXAMINATION:  The patient is alert, disoriented.  Mood is

agitated.  Affect is flat.  Thought process is concrete.  Thought content,

no suicidal or homicidal ideation.  Cognition is impaired.  Insight and

judgment impaired.



ASSESSMENT:  Dementia with behavior disturbance.



PLAN:

1. Ativan p.r.n.

2. Lexapro in the morning.

3. Provide the patient with reality orientation and supportive

therapy.









  ______________________________________________

  Janeth Montiel M.D.





DR:  Karen

D:  03/10/2020 20:44

T:  03/11/2020 01:18

JOB#:  [id number]

CC:



JESSICA

## 2020-03-11 NOTE — PROGRESS NOTE
DATE:  03/10/2020

CARDIOLOGY PROGRESS NOTE



SUBJECTIVE:  Less congestion and shortness of breath.  Vitals are stable.

Afebrile.  Discharge planning noted.  The patient is on ertapenem for

urinary infection.



IMPRESSION:

1. Urinary tract infection.

2. Influenza, status post treatment.

3. Hypertensive heart disease.

4. Acute on chronic diastolic congestive heart failure.



PLAN:

1. Stable for transfer to skilled nursing facility.

2. We will complete antimicrobials for urinary infection upon

discharge.









  ______________________________________________

  Conor Patterson M.D.





DR:  ALONZO

D:  03/11/2020 00:58

T:  03/11/2020 01:02

JOB#:  [id number]

CC:

## 2020-03-12 NOTE — DISCHARGE SUMMARY
DATE OF ADMISSION:  03/04/2020



DATE OF DISCHARGE:  03/10/2020



ADMISSION DIAGNOSES:

1. Fevers.

2. Influenza.

3. Depression.

4. History of diabetes.



DISCHARGE DIAGNOSES:

1. Fevers.

2. Influenza.

3. Depression.

4. History of diabetes.  _____



HOSPITAL COURSE:  The patient is an elderly  female _____ complaints

of fevers.  She was diagnosed with influenza.  She received Tamiflu.  She

had elevated white count and fever while in-house.  Urine cultures grew

ESBL.  ID consultation was obtained.  The patient was started on

ertapenem.  Clinically, she improved, although she had some agitation and

uncooperative behavior.  She _____.  She required anxiolytics secondary to

_____.  On discharge, the patient was stable.  She will be discharged to a

skilled nursing facility to complete three additional days of IV

antibiotic therapy.



DISCHARGE MEDICATIONS:  Please see discharge medication list for discharge

medications.



DIET:  _____ diabetic diet.



ACTIVITIES:  Ad-lizabeth.



FOLLOWUP:  The patient is to follow up in _____.









  ______________________________________________

  Michael Hernández M.D.





DR:  GREG

D:  03/10/2020 17:43

T:  03/11/2020 16:28

JOB#:  [id number]

CC:

## 2020-04-03 NOTE — CODER PHYSICIAN QUERY
Clarification is required for compliance, coding accuracy, and to reflect 
severity of illness for this patient.



Dear Dr. GARCIA                             Date:04/03/20



Admit: 03/04/20   Discharged: 03/10/20



HISTORY OF PRESENT ILLNESS:  The patient is an 89-year-old female.  She has

multiple medical problems including history of chronic kidney disease,

hypertension, diabetes, stroke, congestive heart failure.  She presented

from a skilled nursing facility with complaints of fevers, cough,

congestion.  She was influenza A positive. admitted with

complaints of influenza and urinary tract infection, possible sepsis.

The patient with no new complaints.  On antimicrobials.  She

has a multi-drug resistant E. coli urinary infection as well as influenza



WBC:  03/04/20  4.8, 03/05/20  4.2L



Infectious Disease or Pulmonary did not mention sepsis in their notes & 
consults.



--------------------------------------------------------------------------------
--------------------------------





A posssible diagnois of__SEPSIS_______________was made in the medical record in 
your PROG NOTES. Upon review, it is difficult to determine whether this 
diagnosis has been ruled in, ruled out,or is still being worked up. Please 
indicate below the status of the aforementioned diagnosis.





[] Ruled out



[x] Treated and resolve   

                        

[] Presumed and treated



[] Currently under treatment



[] Still being worked-up





_________________                                    _____________

CARI GARCIA M.D.                                     Date



Please also document in your Progress Notes and/or Discharge Summary and 
indicate if the condition was present on admission.







 

[facility]